# Patient Record
Sex: MALE | Race: WHITE | Employment: OTHER | URBAN - METROPOLITAN AREA
[De-identification: names, ages, dates, MRNs, and addresses within clinical notes are randomized per-mention and may not be internally consistent; named-entity substitution may affect disease eponyms.]

---

## 2019-06-17 ENCOUNTER — HOSPITAL ENCOUNTER (OUTPATIENT)
Dept: MRI IMAGING | Age: 64
Discharge: HOME | End: 2019-06-17
Attending: RADIOLOGY
Payer: MEDICARE

## 2019-06-17 ENCOUNTER — HOSPITAL ENCOUNTER (OUTPATIENT)
Dept: NUCLEAR MEDICINE | Age: 64
Discharge: HOME | End: 2019-06-17
Attending: UROLOGY
Payer: MEDICARE

## 2019-06-17 ENCOUNTER — HOSPITAL ENCOUNTER (OUTPATIENT)
Dept: RADIATION THERAPY | Age: 64
Discharge: HOME | End: 2019-06-17
Payer: MEDICARE

## 2019-06-17 ENCOUNTER — HOSPITAL ENCOUNTER (OUTPATIENT)
Dept: CT IMAGING | Age: 64
Discharge: HOME | End: 2019-06-17
Attending: UROLOGY
Payer: MEDICARE

## 2019-06-17 VITALS — WEIGHT: 170 LBS | BODY MASS INDEX: 23.71 KG/M2

## 2019-06-17 DIAGNOSIS — C61 PROSTATE CANCER (HCC): ICD-10-CM

## 2019-06-17 DIAGNOSIS — C61 PROSTATE CA (HCC): ICD-10-CM

## 2019-06-17 LAB — CREAT UR-MCNC: 0.8 MG/DL (ref 0.6–1.3)

## 2019-06-17 PROCEDURE — 74011636320 HC RX REV CODE- 636/320: Performed by: UROLOGY

## 2019-06-17 PROCEDURE — 99211 OFF/OP EST MAY X REQ PHY/QHP: CPT

## 2019-06-17 PROCEDURE — 74011250636 HC RX REV CODE- 250/636: Performed by: RADIOLOGY

## 2019-06-17 PROCEDURE — A9575 INJ GADOTERATE MEGLUMI 0.1ML: HCPCS | Performed by: RADIOLOGY

## 2019-06-17 PROCEDURE — 72197 MRI PELVIS W/O & W/DYE: CPT

## 2019-06-17 PROCEDURE — 74177 CT ABD & PELVIS W/CONTRAST: CPT

## 2019-06-17 PROCEDURE — 78306 BONE IMAGING WHOLE BODY: CPT

## 2019-06-17 PROCEDURE — 82565 ASSAY OF CREATININE: CPT

## 2019-06-17 RX ORDER — GADOTERATE MEGLUMINE 376.9 MG/ML
15 INJECTION INTRAVENOUS
Status: COMPLETED | OUTPATIENT
Start: 2019-06-17 | End: 2019-06-17

## 2019-06-17 RX ADMIN — IOHEXOL 50 ML: 240 INJECTION, SOLUTION INTRATHECAL; INTRAVASCULAR; INTRAVENOUS; ORAL at 11:39

## 2019-06-17 RX ADMIN — IOPAMIDOL 97 ML: 612 INJECTION, SOLUTION INTRAVENOUS at 11:39

## 2019-06-17 RX ADMIN — GADOTERATE MEGLUMINE 15 ML: 376.9 INJECTION INTRAVENOUS at 10:01

## 2019-06-25 ENCOUNTER — HOSPITAL ENCOUNTER (OUTPATIENT)
Dept: LAB | Age: 64
Discharge: HOME | End: 2019-06-25
Payer: MEDICARE

## 2019-06-25 ENCOUNTER — HOSPITAL ENCOUNTER (OUTPATIENT)
Dept: PREADMISSION TESTING | Age: 64
Discharge: HOME | End: 2019-06-25
Payer: MEDICARE

## 2019-06-25 ENCOUNTER — HOSPITAL ENCOUNTER (OUTPATIENT)
Dept: RADIATION THERAPY | Age: 64
Discharge: HOME | End: 2019-06-25
Payer: MEDICARE

## 2019-06-25 DIAGNOSIS — C61 MALIGNANT NEOPLASM OF PROSTATE (HCC): ICD-10-CM

## 2019-06-25 LAB
ANION GAP SERPL CALC-SCNC: 7 MMOL/L (ref 3–18)
APPEARANCE UR: CLEAR
ATRIAL RATE: 46 BPM
BACTERIA URNS QL MICRO: NEGATIVE /HPF
BASOPHILS # BLD: 0 K/UL (ref 0–0.1)
BASOPHILS NFR BLD: 0 % (ref 0–2)
BILIRUB UR QL: NEGATIVE
BUN SERPL-MCNC: 11 MG/DL (ref 7–18)
BUN/CREAT SERPL: 11 (ref 12–20)
CALCIUM SERPL-MCNC: 9.4 MG/DL (ref 8.5–10.1)
CALCULATED P AXIS, ECG09: 58 DEGREES
CALCULATED R AXIS, ECG10: 31 DEGREES
CALCULATED T AXIS, ECG11: 29 DEGREES
CHLORIDE SERPL-SCNC: 102 MMOL/L (ref 100–108)
CO2 SERPL-SCNC: 30 MMOL/L (ref 21–32)
COLOR UR: YELLOW
CREAT SERPL-MCNC: 0.98 MG/DL (ref 0.6–1.3)
DIAGNOSIS, 93000: NORMAL
DIFFERENTIAL METHOD BLD: ABNORMAL
EOSINOPHIL # BLD: 0.1 K/UL (ref 0–0.4)
EOSINOPHIL NFR BLD: 1 % (ref 0–5)
EPITH CASTS URNS QL MICRO: NORMAL /LPF (ref 0–5)
ERYTHROCYTE [DISTWIDTH] IN BLOOD BY AUTOMATED COUNT: 13.2 % (ref 11.6–14.5)
GLUCOSE SERPL-MCNC: 99 MG/DL (ref 74–99)
GLUCOSE UR STRIP.AUTO-MCNC: NEGATIVE MG/DL
HCT VFR BLD AUTO: 44.2 % (ref 36–48)
HGB BLD-MCNC: 14.5 G/DL (ref 13–16)
HGB UR QL STRIP: NEGATIVE
KETONES UR QL STRIP.AUTO: 15 MG/DL
LEUKOCYTE ESTERASE UR QL STRIP.AUTO: NEGATIVE
LYMPHOCYTES # BLD: 1.5 K/UL (ref 0.9–3.6)
LYMPHOCYTES NFR BLD: 22 % (ref 21–52)
MCH RBC QN AUTO: 31.3 PG (ref 24–34)
MCHC RBC AUTO-ENTMCNC: 32.8 G/DL (ref 31–37)
MCV RBC AUTO: 95.3 FL (ref 74–97)
MONOCYTES # BLD: 0.4 K/UL (ref 0.05–1.2)
MONOCYTES NFR BLD: 6 % (ref 3–10)
NEUTS SEG # BLD: 4.6 K/UL (ref 1.8–8)
NEUTS SEG NFR BLD: 71 % (ref 40–73)
NITRITE UR QL STRIP.AUTO: NEGATIVE
P-R INTERVAL, ECG05: 148 MS
PH UR STRIP: 5.5 [PH] (ref 5–8)
PLATELET # BLD AUTO: 229 K/UL (ref 135–420)
PMV BLD AUTO: 11.7 FL (ref 9.2–11.8)
POTASSIUM SERPL-SCNC: 4.5 MMOL/L (ref 3.5–5.5)
PROT UR STRIP-MCNC: ABNORMAL MG/DL
PSA SERPL-MCNC: 3.9 NG/ML (ref 0–4)
Q-T INTERVAL, ECG07: 454 MS
QRS DURATION, ECG06: 104 MS
QTC CALCULATION (BEZET), ECG08: 397 MS
RBC # BLD AUTO: 4.64 M/UL (ref 4.7–5.5)
RBC #/AREA URNS HPF: 0 /HPF (ref 0–5)
SODIUM SERPL-SCNC: 139 MMOL/L (ref 136–145)
SP GR UR REFRACTOMETRY: 1.02 (ref 1–1.03)
UROBILINOGEN UR QL STRIP.AUTO: 0.2 EU/DL (ref 0.2–1)
VENTRICULAR RATE, ECG03: 46 BPM
WBC # BLD AUTO: 6.6 K/UL (ref 4.6–13.2)
WBC URNS QL MICRO: NORMAL /HPF (ref 0–4)

## 2019-06-25 PROCEDURE — 76873 ECHOGRAP TRANS R PROS STUDY: CPT

## 2019-06-25 PROCEDURE — 80048 BASIC METABOLIC PNL TOTAL CA: CPT

## 2019-06-25 PROCEDURE — 84153 ASSAY OF PSA TOTAL: CPT

## 2019-06-25 PROCEDURE — 36415 COLL VENOUS BLD VENIPUNCTURE: CPT

## 2019-06-25 PROCEDURE — 85025 COMPLETE CBC W/AUTO DIFF WBC: CPT

## 2019-06-25 PROCEDURE — 84403 ASSAY OF TOTAL TESTOSTERONE: CPT

## 2019-06-25 PROCEDURE — 87086 URINE CULTURE/COLONY COUNT: CPT

## 2019-06-25 PROCEDURE — 93005 ELECTROCARDIOGRAM TRACING: CPT

## 2019-06-25 PROCEDURE — 81001 URINALYSIS AUTO W/SCOPE: CPT

## 2019-06-26 LAB — TESTOST SERPL-MCNC: 834 NG/DL (ref 264–916)

## 2019-06-27 LAB
BACTERIA SPEC CULT: NORMAL
SERVICE CMNT-IMP: NORMAL

## 2019-07-22 ENCOUNTER — HOSPITAL ENCOUNTER (OUTPATIENT)
Dept: RADIATION THERAPY | Age: 64
Discharge: HOME | End: 2019-07-22
Admitting: GENERAL ACUTE CARE HOSPITAL
Payer: MEDICARE

## 2019-07-22 ENCOUNTER — ANESTHESIA (OUTPATIENT)
Dept: RADIATION THERAPY | Age: 64
End: 2019-07-22
Payer: MEDICARE

## 2019-07-22 ENCOUNTER — ANESTHESIA EVENT (OUTPATIENT)
Dept: RADIATION THERAPY | Age: 64
End: 2019-07-22
Payer: MEDICARE

## 2019-07-22 VITALS
SYSTOLIC BLOOD PRESSURE: 124 MMHG | BODY MASS INDEX: 22.56 KG/M2 | OXYGEN SATURATION: 99 % | DIASTOLIC BLOOD PRESSURE: 81 MMHG | HEART RATE: 52 BPM | WEIGHT: 161.13 LBS | TEMPERATURE: 97.5 F | HEIGHT: 71 IN | RESPIRATION RATE: 13 BRPM

## 2019-07-22 PROCEDURE — 77030032490 HC SLV COMPR SCD KNE COVD -B

## 2019-07-22 PROCEDURE — C2643 BRACHYTX, NON-STRANDED,C-131: HCPCS

## 2019-07-22 PROCEDURE — 77778 APPLY INTERSTIT RADIAT COMPL: CPT

## 2019-07-22 PROCEDURE — C2642 BRACHYTX, STRANDED, C-131: HCPCS

## 2019-07-22 PROCEDURE — 77030012510 HC MSK AIRWY LMA TELE -B: Performed by: RADIOLOGY

## 2019-07-22 PROCEDURE — 74011250636 HC RX REV CODE- 250/636: Performed by: RADIOLOGY

## 2019-07-22 PROCEDURE — 74011636320 HC RX REV CODE- 636/320: Performed by: RADIOLOGY

## 2019-07-22 PROCEDURE — 77332 RADIATION TREATMENT AID(S): CPT

## 2019-07-22 PROCEDURE — 74011250636 HC RX REV CODE- 250/636

## 2019-07-22 PROCEDURE — 77295 3-D RADIOTHERAPY PLAN: CPT

## 2019-07-22 PROCEDURE — 77030036874 HC SPCR RECTAL HYDRGEL SPACEOAR AGMX -I

## 2019-07-22 PROCEDURE — 76060000034 HC ANESTHESIA 1.5 TO 2 HR

## 2019-07-22 PROCEDURE — A4648 IMPLANTABLE TISSUE MARKER: HCPCS

## 2019-07-22 PROCEDURE — 74011250637 HC RX REV CODE- 250/637: Performed by: RADIOLOGY

## 2019-07-22 PROCEDURE — 77030013079 HC BLNKT BAIR HGGR 3M -A: Performed by: RADIOLOGY

## 2019-07-22 PROCEDURE — 51798 US URINE CAPACITY MEASURE: CPT

## 2019-07-22 PROCEDURE — 77030034850

## 2019-07-22 PROCEDURE — 77030015736 HC BLLN ENDOCAV CIVC -B

## 2019-07-22 PROCEDURE — 73290000070 HC RAD ONC TIME 1.5 TO 2 HR

## 2019-07-22 RX ORDER — MIDAZOLAM HYDROCHLORIDE 1 MG/ML
INJECTION, SOLUTION INTRAMUSCULAR; INTRAVENOUS AS NEEDED
Status: DISCONTINUED | OUTPATIENT
Start: 2019-07-22 | End: 2019-07-22 | Stop reason: HOSPADM

## 2019-07-22 RX ORDER — ONDANSETRON HYDROCHLORIDE 4 MG/2ML
INJECTION, SOLUTION INTRAMUSCULAR; INTRAVENOUS AS NEEDED
Status: DISCONTINUED | OUTPATIENT
Start: 2019-07-22 | End: 2019-07-22 | Stop reason: HOSPADM

## 2019-07-22 RX ORDER — LIDOCAINE HYDROCHLORIDE 20 MG/ML
INJECTION, SOLUTION EPIDURAL; INFILTRATION; INTRACAUDAL; PERINEURAL AS NEEDED
Status: DISCONTINUED | OUTPATIENT
Start: 2019-07-22 | End: 2019-07-22 | Stop reason: HOSPADM

## 2019-07-22 RX ORDER — FENTANYL CITRATE 50 UG/ML
INJECTION, SOLUTION INTRAMUSCULAR; INTRAVENOUS AS NEEDED
Status: DISCONTINUED | OUTPATIENT
Start: 2019-07-22 | End: 2019-07-22 | Stop reason: HOSPADM

## 2019-07-22 RX ORDER — GLUCOSAMINE SULFATE 1500 MG
500 POWDER IN PACKET (EA) ORAL DAILY
COMMUNITY

## 2019-07-22 RX ORDER — KETOROLAC TROMETHAMINE 30 MG/ML
INJECTION, SOLUTION INTRAMUSCULAR; INTRAVENOUS AS NEEDED
Status: DISCONTINUED | OUTPATIENT
Start: 2019-07-22 | End: 2019-07-22 | Stop reason: HOSPADM

## 2019-07-22 RX ORDER — SODIUM CHLORIDE, SODIUM LACTATE, POTASSIUM CHLORIDE, CALCIUM CHLORIDE 600; 310; 30; 20 MG/100ML; MG/100ML; MG/100ML; MG/100ML
50 INJECTION, SOLUTION INTRAVENOUS CONTINUOUS
Status: DISCONTINUED | OUTPATIENT
Start: 2019-07-22 | End: 2019-07-26 | Stop reason: HOSPADM

## 2019-07-22 RX ORDER — PROPOFOL 10 MG/ML
INJECTION, EMULSION INTRAVENOUS AS NEEDED
Status: DISCONTINUED | OUTPATIENT
Start: 2019-07-22 | End: 2019-07-22 | Stop reason: HOSPADM

## 2019-07-22 RX ORDER — DEXAMETHASONE SODIUM PHOSPHATE 4 MG/ML
INJECTION, SOLUTION INTRA-ARTICULAR; INTRALESIONAL; INTRAMUSCULAR; INTRAVENOUS; SOFT TISSUE AS NEEDED
Status: DISCONTINUED | OUTPATIENT
Start: 2019-07-22 | End: 2019-07-22 | Stop reason: HOSPADM

## 2019-07-22 RX ORDER — SODIUM CHLORIDE 0.9 % (FLUSH) 0.9 %
10 SYRINGE (ML) INJECTION AS NEEDED
Status: DISCONTINUED | OUTPATIENT
Start: 2019-07-22 | End: 2019-07-26 | Stop reason: HOSPADM

## 2019-07-22 RX ORDER — FAMOTIDINE 20 MG/1
20 TABLET, FILM COATED ORAL ONCE
Status: COMPLETED | OUTPATIENT
Start: 2019-07-22 | End: 2019-07-22

## 2019-07-22 RX ORDER — CEFAZOLIN SODIUM 2 G/50ML
2 SOLUTION INTRAVENOUS ONCE
Status: COMPLETED | OUTPATIENT
Start: 2019-07-22 | End: 2019-07-22

## 2019-07-22 RX ADMIN — MIDAZOLAM HYDROCHLORIDE 2 MG: 1 INJECTION, SOLUTION INTRAMUSCULAR; INTRAVENOUS at 10:21

## 2019-07-22 RX ADMIN — LIDOCAINE HYDROCHLORIDE 40 MG: 20 INJECTION, SOLUTION EPIDURAL; INFILTRATION; INTRACAUDAL; PERINEURAL at 10:24

## 2019-07-22 RX ADMIN — Medication 10 ML: at 09:25

## 2019-07-22 RX ADMIN — ONDANSETRON HYDROCHLORIDE 4 MG: 4 INJECTION, SOLUTION INTRAMUSCULAR; INTRAVENOUS at 11:46

## 2019-07-22 RX ADMIN — IOVERSOL 50 ML: 678 INJECTION INTRA-ARTERIAL; INTRAVENOUS at 09:24

## 2019-07-22 RX ADMIN — CEFAZOLIN SODIUM 2 G: 2 SOLUTION INTRAVENOUS at 10:30

## 2019-07-22 RX ADMIN — SODIUM CHLORIDE, SODIUM LACTATE, POTASSIUM CHLORIDE, AND CALCIUM CHLORIDE 50 ML/HR: 600; 310; 30; 20 INJECTION, SOLUTION INTRAVENOUS at 09:25

## 2019-07-22 RX ADMIN — DEXAMETHASONE SODIUM PHOSPHATE 4 MG: 4 INJECTION, SOLUTION INTRA-ARTICULAR; INTRALESIONAL; INTRAMUSCULAR; INTRAVENOUS; SOFT TISSUE at 10:24

## 2019-07-22 RX ADMIN — KETOROLAC TROMETHAMINE 30 MG: 30 INJECTION, SOLUTION INTRAMUSCULAR; INTRAVENOUS at 11:46

## 2019-07-22 RX ADMIN — FENTANYL CITRATE 100 MCG: 50 INJECTION, SOLUTION INTRAMUSCULAR; INTRAVENOUS at 10:24

## 2019-07-22 RX ADMIN — FAMOTIDINE 20 MG: 20 TABLET, FILM COATED ORAL at 09:23

## 2019-07-22 RX ADMIN — PROPOFOL 150 MG: 10 INJECTION, EMULSION INTRAVENOUS at 10:24

## 2019-07-22 NOTE — ANESTHESIA PREPROCEDURE EVALUATION
Relevant Problems   No relevant active problems       Anesthetic History   No history of anesthetic complications            Review of Systems / Medical History  Patient summary reviewed, nursing notes reviewed and pertinent labs reviewed    Pulmonary  Within defined limits                 Neuro/Psych   Within defined limits           Cardiovascular                  Exercise tolerance: >4 METS     GI/Hepatic/Renal  Within defined limits              Endo/Other  Within defined limits           Other Findings              Physical Exam    Airway  Mallampati: II  TM Distance: 4 - 6 cm  Neck ROM: normal range of motion   Mouth opening: Normal     Cardiovascular    Rhythm: regular  Rate: normal         Dental  No notable dental hx       Pulmonary  Breath sounds clear to auscultation               Abdominal  Abdominal exam normal       Other Findings            Anesthetic Plan    ASA: 1  Anesthesia type: general          Induction: Intravenous  Anesthetic plan and risks discussed with: Patient

## 2019-07-22 NOTE — PROGRESS NOTES
Received AOX3. Assisted to changing area. Placed into gown and anti slip socks. Verbalized taking enema's. Took no am medications and has cut back on alcohol. Denies suicidal ideation. 0920  Assessment done. Pre-op information obtained. All questions answered. 0930 Family at [facility]. 1205 Arrived to post operative area B.  VSS. Arousable. 1230  Vitals remain stable. Unable to urinate. 1300 Encouraged fluids. No urge to void at this time. 1334 Bladder scan 210.    1400 Voided 100ml with BS 129ml. 1411 Voided 100 with BS 21. Iv removed. Discharge instructions reviewed and a copy given to patient's wife. D/C via w/c.

## 2019-07-22 NOTE — PROGRESS NOTES
PROCEDURE NOTE    Pt arrived to procedure room at 1020, pt placed supine, monitors placed, bryant inserted with 0.5ml Optiray & 9.5ml NS in balloon, Drain & then clamped w/ 150ml Sterile water instilled in bladder.      Body aligned SCDs placed CEASAR LE, Pt arms on armboard with eggcrate for pressure support, head remains aligned Right & Left Leg placed in Yellow Fin Stirrups, eggcrates & gelpads for pressure points    Time-out performed: 1044    Procedure Start: 1045    Procedure stop:1155

## 2019-07-22 NOTE — DISCHARGE INSTRUCTIONS
Post-Operative Seed Implant Instructions    Many radioactive metallic seeds were implanted into your prostate. The seeds are similar to a grain of rice in size and shape. Though the seeds are designed to remain in place, they may be expelled through the urine. This should not alarm you; however, it is important that you do not  the radioactive seed with your fingers. Please use a spoon or a pair of tweezers to place the radioactive seed in the toilet and then flush    During the first few days you may have some bruising on the perineum (the place between your anus and your scrotum) or on the scrotum itself. This is caused by the needles (usually 20-30) which are used to place the seeds. This will resolve on its own and usually does not cause any discomfort. For about a week after treatment, you may have some pain or swelling in the area between your scrotum and rectum, and your urine may be reddish-brown. Rarely a larger hematoma may form on the perineum and this will cause some discomfort with sitting. This should be brought to the doctors attention, but it will resolve on its own. You can alternate between an ice pack and heat pack for 10 minutes interval to assist with the reduction of inflammation and pain to perineum. Side Effects    Immediately post procedure: blood in the urine, bruising/tenderness/discoloration at the implant site, and/or swelling at the implant site. These symptoms should subside after a few days. Some patients will have trouble passing urine after the catheter is removed due to swelling in the prostate. You should call Dr. Piero Quinn or go to Emergency Room if you cannot pass urine within 6 hours of catheter removal or any time if you are having trouble passing your urine. Some patients may require a catheter for 1 week or more after seed implantation. Other:   The following side effects are most likely to occur over the first two months following the procedure: Frequency and/or urgency with urination, burning with urination, a weaker urine stream, as well as frequency and /or urgency of bowel movements. After the initial two months, you should notice a steady decline in these symptoms. Your symptoms should subside completely by the end of six month to a year. Precautions     Abstain from sexual activity for two weeks   After the initial two weeks post procedure, you will be required to use a condom for the next 60 days anytime you have intercourse   Do not let children under  eight years of age sit on your lap for the first sixty days   Do not let pregnant women sit on your lap for the first sixty days. Diet:    Regular, unless you are on a special diet for other reasons. Medication:     Patient may continue the following medications as directed by Physician:     Bactrim DS (800-160 mg) Tablet Oral Take as Directed (7/23/2019) start medication  Cholecalciferol 2 Tablet (of 5000 Units) Capsule Oral q 1 weeks (6/14/2019)  continue  Naproxen (500 mg) Tablet Oral Take as Directed (07/23/19) start medication  Niacin (100 mg) Tablet Oral daily (6/14/2019  continue  Tamsulosin HCl (0.4 mg) Capsule Oral Take as Directed (6/25/2019) continue-resume tonight 07/22/19    Signing of this document acknowledges that the patient has been given a copy of his/her current medications. Activity:     Avoid heavy lifting or strenuous physical activity for the first two days after the procedure. At that time you may return to your normal activity level if the urine is clear and you feel fine. If the urine is still bloody you should rest and drink plenty of fluids until it is clear, and then you may resume normal activity. Avoid heavy lifting for one month. Avoid sitting on a hard seat (such as a bicycle) for 2 months. Avoid long periods of sitting, such as in a car or on an airplane without taking leg stretching  breaks.     Depending on the demands of your job, you may return to work any time during the week after the procedure, noting the precaution about prolonged sitting. You may return to a regular diet as tolerated following the procedure. Do not drink excessive amounts of fluids, as they can make side effects worse. You will experience a decrease in ejaculate following the procedures. This is normal, as the prostate gland is responsible for generating over 80% of the fluid disseminated during  ejaculation. You will most likely experience a reddish color in your ejaculate for a few weeks following the procedure. This is normal and will improve as time  passes, if it persists, see your doctor. Follow up     Your follow up imaging will be at [facility] at [facility]/[address]  on ___08/22/19 ________at __0900_________ am.    Please call us if you have any questions: [phone]    Radiation Therapy     DISCHARGE SUMMARY from Nurse    The following personal items are in your possession at time of discharge:    Shirt, pants, shoes and under garments      PATIENT INSTRUCTIONS:    After general anesthesia or intravenous sedation, for 24 hours or while taking prescription Narcotics:  · Limit your activities  · Do not drive and operate hazardous machinery  · Do not make important personal or business decisions  · Do  not drink alcoholic beverages  · If you have not urinated within 8 hours after discharge, please contact your surgeon on call. Report the following to your surgeon:  · Excessive pain, swelling, redness or odor of or around the surgical area  · Temperature over 100.5F  · Nausea and vomiting lasting longer than 4 hours or if unable to take medications  · Any signs of decreased circulation or nerve impairment to extremity: change in color, persistent  numbness, tingling, coldness or increase pain  · Any questions      What to do at Home:    Recommended activity: Activity as tolerated and no driving for today.         *  Please give a list of your current medications to your Primary Care Provider. *  Please update this list whenever your medications are discontinued, doses are      changed, or new medications (including over-the-counter products) are added. *  Please carry medication information at all times in case of emergency situations. These are general instructions for a healthy lifestyle:    No smoking/ No tobacco products/ Avoid exposure to second hand smoke    Surgeon General's Warning:  Quitting smoking now greatly reduces serious risk to your health. Obesity, smoking, and sedentary lifestyle greatly increases your risk for illness    A healthy diet, regular physical exercise & weight monitoring are important for maintaining a healthy lifestyle    You may be retaining fluid if you have a history of heart failure or if you experience any of the following symptoms:  Weight gain of 3 pounds or more overnight or 5 pounds in a week, increased swelling in our hands or feet or shortness of breath while lying flat in bed. Please call your doctor as soon as you notice any of these symptoms; do not wait until your next office visit. Recognize signs and symptoms of STROKE:    F-face looks uneven    A-arms unable to move or move unevenly    S-speech slurred or non-existent    T-time-call 911 as soon as signs and symptoms begin-DO NOT go       Back to bed or wait to see if you get better-TIME IS BRAIN. Warning Signs of HEART ATTACK     Call 911 if you have these symptoms:   Chest discomfort. Most heart attacks involve discomfort in the center of the chest that lasts more than a few minutes, or that goes away and comes back. It can feel like uncomfortable pressure, squeezing, fullness, or pain.  Discomfort in other areas of the upper body. Symptoms can include pain or discomfort in one or both arms, the back, neck, jaw, or stomach.  Shortness of breath with or without chest discomfort.    Other signs may include breaking out in a cold sweat, nausea, or lightheadedness. Don't wait more than five minutes to call 911 - MINUTES MATTER! Fast action can save your life. Calling 911 is almost always the fastest way to get lifesaving treatment. Emergency Medical Services staff can begin treatment when they arrive -- up to an hour sooner than if someone gets to the hospital by car. The discharge information has been reviewed with the patient and spouse. The patient and spouse verbalized understanding. Discharge medications reviewed with the patient and spouse and appropriate educational materials and side effects teaching were provided.

## 2019-07-22 NOTE — OP NOTES
Date of Procedure: 7/21/2019    Preoperative Diagnosis: Adenocarcinoma of the Prostate, Casper's 7 (3+4), Presenting PSA of 3.7 Clinical stage cT1c, Nx, Mx    Postoperative Diagnosis: same    Procedure:  Transperineal Interstitial Prostate Brachytherapy, Fiducial Marker seed Implant, Space-OAR implant. Surgeon:  Romana Redden, MD    Radiation Therapist:  Tony Cisneros MD    Anesthesia: General    Estimated Blood Loss: less than 20 cc    Specimens: none    Findings: Prostate Volume: 45.65 cc, Total of 22 needles placed to deposit 64 seeds for total energy of 115.20 Units.  V100 of 08.84%    Complications: none    Implants: Cs131 radioactive seed sources. Suzie Thompson is a 75 y.o. male who is seen in consultation as referred by Dr. Sathish Marin for prostate cancer. He is S/p TRUSbx 4/25/2019 revealing Santo 7 (3+4) in 6/12 cores, TRUS volume 40 g, presenting PSA 3.7 ng/mL. Had prolaris score of 5.1. More aggressive than patients in the same category. He was found to have the above cancer and has decided on combination brachytherapy, IMRT and ADT as his definitve therapy for his prostate cancer.  He has been completely advised of potential risks and complications of the procedure. Risks and benefits, as well as alternatives, have been discussed with the patient and he agrees to proceed.      DESCRIPTION OF PROCEDURE: The patient was identified in the holding area,  given informed consent again, and then was taken to the cystoscopy suite. On the cystoscopy table, the patient was placed in the supine position and  underwent adequate general anesthetic and then was placed in dorsal  lithotomy position, appropriately padded, prepped and draped in the usual  sterile fashion for transperineal procedure. The patient had a time-out  taken, all were in agreement on the procedure. SCDs were placed for DVT  prophylaxis. Appropriate parenteral prophylactic antibiotics were given.   The patient had burn precautions, beta blocker concerns addressed. The  patient then underwent further prep with the scrotum placed cephalad, Simon  catheter with contrast in the balloon was placed to identify the bladder  neck. The patient then underwent transrectal ultrasonography. Perineum was  prepped and then real-time volumetrics and contouring was  performed. This was correlated with the pre-planned program from Dr. Nga Esposito with the correlation of these performed. The seed implant  dosimetry was superimposed on real-time imaging and then we were able to  proceed with placement of the needles. The needles were placed in the usual  fashion, and after needle placement, we confirmed adequate coverage of the  procedure, with greater than 99% V100. At this point, we then deposited the  seeds in the longitudinal section of the ultrasound. With retraction points  addressed, we were able to place the seeds appropriately per pre-planned  dosimetry. After the seeds were placed from the left to right, superior  to posterior approach, with all needles removed and seeds placed, we then  performed real-time dosimetry once again and found 3 additional seed  sources were necessary to obtain a V100 of 98.01%. Dr. Nga Esposito inserted the Fiducial marker seed implants and Space-OAR per his separate dictation. At this point, the procedure was terminated, Simon catheter removed, perineum was cleansed with saline, and bacitracin ointment was applied. The patient was carefully  taken out of the dorsal lithotomy position, and 2D fluoroscopy confirmed adequate positioning of the seeds, and the patient was taken to the recovery room in stable condition.       Jason Hooper MD

## 2019-08-22 ENCOUNTER — HOSPITAL ENCOUNTER (OUTPATIENT)
Dept: RADIATION THERAPY | Age: 64
Discharge: HOME | End: 2019-08-22
Payer: MEDICARE

## 2019-08-22 PROCEDURE — 77334 RADIATION TREATMENT AID(S): CPT

## 2019-08-23 ENCOUNTER — HOSPITAL ENCOUNTER (OUTPATIENT)
Dept: RADIATION THERAPY | Age: 64
Discharge: HOME | End: 2019-08-23
Payer: MEDICARE

## 2019-08-26 ENCOUNTER — HOSPITAL ENCOUNTER (OUTPATIENT)
Dept: RADIATION THERAPY | Age: 64
Discharge: HOME | End: 2019-08-26
Payer: MEDICARE

## 2019-08-26 PROCEDURE — 77318 BRACHYTX ISODOSE COMPLEX: CPT

## 2019-08-27 ENCOUNTER — HOSPITAL ENCOUNTER (OUTPATIENT)
Dept: RADIATION THERAPY | Age: 64
Discharge: HOME | End: 2019-08-27
Payer: MEDICARE

## 2019-08-27 PROCEDURE — 77370 RADIATION PHYSICS CONSULT: CPT

## 2019-08-28 ENCOUNTER — HOSPITAL ENCOUNTER (OUTPATIENT)
Dept: RADIATION THERAPY | Age: 64
Discharge: HOME | End: 2019-08-28
Payer: MEDICARE

## 2019-08-28 PROCEDURE — 77301 RADIOTHERAPY DOSE PLAN IMRT: CPT

## 2019-08-28 PROCEDURE — 77300 RADIATION THERAPY DOSE PLAN: CPT

## 2019-08-28 PROCEDURE — 77338 DESIGN MLC DEVICE FOR IMRT: CPT

## 2019-08-29 ENCOUNTER — HOSPITAL ENCOUNTER (OUTPATIENT)
Dept: RADIATION THERAPY | Age: 64
Discharge: HOME | End: 2019-08-29
Payer: MEDICARE

## 2019-08-30 ENCOUNTER — HOSPITAL ENCOUNTER (OUTPATIENT)
Dept: RADIATION THERAPY | Age: 64
Discharge: HOME | End: 2019-08-30
Payer: MEDICARE

## 2019-09-03 ENCOUNTER — HOSPITAL ENCOUNTER (OUTPATIENT)
Dept: RADIATION THERAPY | Age: 64
Discharge: HOME | End: 2019-09-03
Payer: MEDICARE

## 2019-09-04 ENCOUNTER — HOSPITAL ENCOUNTER (OUTPATIENT)
Dept: RADIATION THERAPY | Age: 64
Discharge: HOME | End: 2019-09-04
Payer: MEDICARE

## 2019-09-05 ENCOUNTER — HOSPITAL ENCOUNTER (OUTPATIENT)
Dept: RADIATION THERAPY | Age: 64
Discharge: HOME | End: 2019-09-05
Payer: MEDICARE

## 2019-09-06 ENCOUNTER — HOSPITAL ENCOUNTER (OUTPATIENT)
Dept: RADIATION THERAPY | Age: 64
Discharge: HOME | End: 2019-09-06
Payer: MEDICARE

## 2019-09-09 ENCOUNTER — HOSPITAL ENCOUNTER (OUTPATIENT)
Dept: RADIATION THERAPY | Age: 64
Discharge: HOME | End: 2019-09-09
Payer: MEDICARE

## 2019-09-09 PROCEDURE — 77385 HC IMRT TRMT DLVR SMPL: CPT

## 2019-09-10 ENCOUNTER — HOSPITAL ENCOUNTER (OUTPATIENT)
Dept: RADIATION THERAPY | Age: 64
Discharge: HOME | End: 2019-09-10
Payer: MEDICARE

## 2019-09-10 PROCEDURE — 77385 HC IMRT TRMT DLVR SMPL: CPT

## 2019-09-11 ENCOUNTER — HOSPITAL ENCOUNTER (OUTPATIENT)
Dept: RADIATION THERAPY | Age: 64
Discharge: HOME | End: 2019-09-11
Payer: MEDICARE

## 2019-09-11 PROCEDURE — 77385 HC IMRT TRMT DLVR SMPL: CPT

## 2019-09-12 ENCOUNTER — HOSPITAL ENCOUNTER (OUTPATIENT)
Dept: RADIATION THERAPY | Age: 64
Discharge: HOME | End: 2019-09-12
Payer: MEDICARE

## 2019-09-12 PROCEDURE — 77385 HC IMRT TRMT DLVR SMPL: CPT

## 2019-09-13 ENCOUNTER — HOSPITAL ENCOUNTER (OUTPATIENT)
Dept: RADIATION THERAPY | Age: 64
Discharge: HOME | End: 2019-09-13
Payer: MEDICARE

## 2019-09-13 PROCEDURE — 77385 HC IMRT TRMT DLVR SMPL: CPT

## 2019-09-13 PROCEDURE — 77336 RADIATION PHYSICS CONSULT: CPT

## 2019-09-16 ENCOUNTER — HOSPITAL ENCOUNTER (OUTPATIENT)
Dept: RADIATION THERAPY | Age: 64
Discharge: HOME | End: 2019-09-16
Payer: MEDICARE

## 2019-09-16 PROCEDURE — 77385 HC IMRT TRMT DLVR SMPL: CPT

## 2019-09-17 ENCOUNTER — HOSPITAL ENCOUNTER (OUTPATIENT)
Dept: RADIATION THERAPY | Age: 64
Discharge: HOME | End: 2019-09-17
Payer: MEDICARE

## 2019-09-17 PROCEDURE — 77385 HC IMRT TRMT DLVR SMPL: CPT

## 2019-09-18 ENCOUNTER — HOSPITAL ENCOUNTER (OUTPATIENT)
Dept: RADIATION THERAPY | Age: 64
Discharge: HOME | End: 2019-09-18
Payer: MEDICARE

## 2019-09-18 PROCEDURE — 77385 HC IMRT TRMT DLVR SMPL: CPT

## 2019-09-19 ENCOUNTER — HOSPITAL ENCOUNTER (OUTPATIENT)
Dept: RADIATION THERAPY | Age: 64
Discharge: HOME | End: 2019-09-19
Payer: MEDICARE

## 2019-09-19 PROCEDURE — 77385 HC IMRT TRMT DLVR SMPL: CPT

## 2019-09-20 ENCOUNTER — HOSPITAL ENCOUNTER (OUTPATIENT)
Dept: RADIATION THERAPY | Age: 64
Discharge: HOME | End: 2019-09-20
Payer: MEDICARE

## 2019-09-20 PROCEDURE — 77385 HC IMRT TRMT DLVR SMPL: CPT

## 2019-09-20 PROCEDURE — 77336 RADIATION PHYSICS CONSULT: CPT

## 2019-09-23 ENCOUNTER — HOSPITAL ENCOUNTER (OUTPATIENT)
Dept: RADIATION THERAPY | Age: 64
Discharge: HOME | End: 2019-09-23
Payer: MEDICARE

## 2019-09-23 PROCEDURE — 77385 HC IMRT TRMT DLVR SMPL: CPT

## 2019-09-24 ENCOUNTER — HOSPITAL ENCOUNTER (OUTPATIENT)
Dept: RADIATION THERAPY | Age: 64
Discharge: HOME | End: 2019-09-24
Payer: MEDICARE

## 2019-09-24 PROCEDURE — 77385 HC IMRT TRMT DLVR SMPL: CPT

## 2019-09-25 ENCOUNTER — HOSPITAL ENCOUNTER (OUTPATIENT)
Dept: RADIATION THERAPY | Age: 64
Discharge: HOME | End: 2019-09-25
Payer: MEDICARE

## 2019-09-25 PROCEDURE — 77385 HC IMRT TRMT DLVR SMPL: CPT

## 2019-09-26 ENCOUNTER — HOSPITAL ENCOUNTER (OUTPATIENT)
Dept: RADIATION THERAPY | Age: 64
Discharge: HOME | End: 2019-09-26
Payer: MEDICARE

## 2019-09-26 PROCEDURE — 77385 HC IMRT TRMT DLVR SMPL: CPT

## 2019-09-27 ENCOUNTER — HOSPITAL ENCOUNTER (OUTPATIENT)
Dept: RADIATION THERAPY | Age: 64
Discharge: HOME | End: 2019-09-27
Payer: MEDICARE

## 2019-09-27 PROCEDURE — 77385 HC IMRT TRMT DLVR SMPL: CPT

## 2019-09-27 PROCEDURE — 77336 RADIATION PHYSICS CONSULT: CPT

## 2019-09-30 ENCOUNTER — HOSPITAL ENCOUNTER (OUTPATIENT)
Dept: RADIATION THERAPY | Age: 64
Discharge: HOME | End: 2019-09-30
Payer: MEDICARE

## 2019-09-30 PROCEDURE — 77385 HC IMRT TRMT DLVR SMPL: CPT

## 2019-10-01 ENCOUNTER — HOSPITAL ENCOUNTER (OUTPATIENT)
Dept: RADIATION THERAPY | Age: 64
Discharge: HOME | End: 2019-10-01
Payer: MEDICARE

## 2019-10-01 PROCEDURE — 77385 HC IMRT TRMT DLVR SMPL: CPT

## 2019-10-02 ENCOUNTER — HOSPITAL ENCOUNTER (OUTPATIENT)
Dept: RADIATION THERAPY | Age: 64
Discharge: HOME | End: 2019-10-02
Payer: MEDICARE

## 2019-10-02 PROCEDURE — 77385 HC IMRT TRMT DLVR SMPL: CPT

## 2019-10-03 ENCOUNTER — HOSPITAL ENCOUNTER (OUTPATIENT)
Dept: RADIATION THERAPY | Age: 64
Discharge: HOME | End: 2019-10-03
Payer: MEDICARE

## 2019-10-03 PROCEDURE — 77385 HC IMRT TRMT DLVR SMPL: CPT

## 2019-10-04 ENCOUNTER — HOSPITAL ENCOUNTER (OUTPATIENT)
Dept: RADIATION THERAPY | Age: 64
Discharge: HOME | End: 2019-10-04
Payer: MEDICARE

## 2019-10-04 PROCEDURE — 77385 HC IMRT TRMT DLVR SMPL: CPT

## 2019-10-04 PROCEDURE — 77336 RADIATION PHYSICS CONSULT: CPT

## 2019-10-07 ENCOUNTER — HOSPITAL ENCOUNTER (OUTPATIENT)
Dept: RADIATION THERAPY | Age: 64
Discharge: HOME | End: 2019-10-07
Payer: MEDICARE

## 2019-10-07 PROCEDURE — 77385 HC IMRT TRMT DLVR SMPL: CPT

## 2019-10-08 ENCOUNTER — HOSPITAL ENCOUNTER (OUTPATIENT)
Dept: RADIATION THERAPY | Age: 64
Discharge: HOME | End: 2019-10-08
Payer: MEDICARE

## 2019-10-08 PROCEDURE — 77385 HC IMRT TRMT DLVR SMPL: CPT

## 2019-10-09 ENCOUNTER — HOSPITAL ENCOUNTER (OUTPATIENT)
Dept: RADIATION THERAPY | Age: 64
Discharge: HOME | End: 2019-10-09
Payer: MEDICARE

## 2019-10-09 PROCEDURE — 77385 HC IMRT TRMT DLVR SMPL: CPT

## 2019-10-10 ENCOUNTER — HOSPITAL ENCOUNTER (OUTPATIENT)
Dept: RADIATION THERAPY | Age: 64
Discharge: HOME | End: 2019-10-10
Payer: MEDICARE

## 2019-10-10 PROCEDURE — 77385 HC IMRT TRMT DLVR SMPL: CPT

## 2019-10-11 ENCOUNTER — HOSPITAL ENCOUNTER (OUTPATIENT)
Dept: RADIATION THERAPY | Age: 64
Discharge: HOME | End: 2019-10-11
Payer: MEDICARE

## 2019-10-11 PROCEDURE — 77385 HC IMRT TRMT DLVR SMPL: CPT

## 2019-11-18 ENCOUNTER — NURSE NAVIGATOR (OUTPATIENT)
Dept: OTHER | Age: 64
End: 2019-11-18

## 2019-11-18 NOTE — NURSE NAVIGATOR
Follow up call to pt to review survivorship care plan mailed to pt. Unable to speak with pt at this time. Left message for pt to return call to review care plan. Survivorship care plan scanned into chart. Unable to send to PCP as none is listed.

## 2020-07-23 ENCOUNTER — APPOINTMENT (OUTPATIENT)
Dept: RADIATION THERAPY | Age: 65
End: 2020-07-23

## 2020-07-31 ENCOUNTER — HOSPITAL ENCOUNTER (OUTPATIENT)
Dept: MRI IMAGING | Age: 65
Discharge: HOME | End: 2020-07-31
Attending: RADIOLOGY
Payer: MEDICARE

## 2020-07-31 VITALS — WEIGHT: 175 LBS | BODY MASS INDEX: 24.41 KG/M2

## 2020-07-31 DIAGNOSIS — K86.2 CYST OF PANCREAS: ICD-10-CM

## 2020-07-31 PROCEDURE — 74011250636 HC RX REV CODE- 250/636: Performed by: RADIOLOGY

## 2020-07-31 PROCEDURE — A9575 INJ GADOTERATE MEGLUMI 0.1ML: HCPCS | Performed by: RADIOLOGY

## 2020-07-31 PROCEDURE — 74183 MRI ABD W/O CNTR FLWD CNTR: CPT

## 2020-07-31 RX ORDER — GADOTERATE MEGLUMINE 376.9 MG/ML
15 INJECTION INTRAVENOUS
Status: COMPLETED | OUTPATIENT
Start: 2020-07-31 | End: 2020-07-31

## 2020-07-31 RX ADMIN — GADOTERATE MEGLUMINE 15 ML: 376.9 INJECTION INTRAVENOUS at 17:54

## 2020-08-13 ENCOUNTER — HOSPITAL ENCOUNTER (OUTPATIENT)
Dept: RADIATION THERAPY | Age: 65
Discharge: HOME | End: 2020-08-13